# Patient Record
Sex: FEMALE | Race: WHITE | NOT HISPANIC OR LATINO | Employment: OTHER | URBAN - METROPOLITAN AREA
[De-identification: names, ages, dates, MRNs, and addresses within clinical notes are randomized per-mention and may not be internally consistent; named-entity substitution may affect disease eponyms.]

---

## 2018-11-26 ENCOUNTER — HOSPITAL ENCOUNTER (OUTPATIENT)
Dept: RADIOLOGY | Facility: HOSPITAL | Age: 83
Discharge: HOME | End: 2018-11-26
Attending: INTERNAL MEDICINE | Admitting: RADIOLOGY
Payer: COMMERCIAL

## 2018-11-26 VITALS
RESPIRATION RATE: 18 BRPM | OXYGEN SATURATION: 97 % | DIASTOLIC BLOOD PRESSURE: 55 MMHG | SYSTOLIC BLOOD PRESSURE: 124 MMHG | HEART RATE: 79 BPM

## 2018-11-26 DIAGNOSIS — R18.8 ASCITES: ICD-10-CM

## 2018-11-26 LAB
ALBUMIN FLD-MCNC: 1.2 G/DL
APPEARANCE FLD: NORMAL
COLOR FLD: NORMAL
LDH FLD L TO P-CCNC: 74 U/L
LYMPHOCYTES NFR BLD AUTO: 64 %
MONO+MESO NFR FLD MANUAL: 8 %
MONONUC CELLS NFR FLD MANUAL: 15 %
NEUTS SEG NFR BLD AUTO: 13 %
PROT FLD-MCNC: 2.3 G/DL
SITE: NORMAL
TOTAL CELLS COUNTED SPEC: 100
WBC # FLD MANUAL: 443 /UL

## 2018-11-26 PROCEDURE — 49083 ABD PARACENTESIS W/IMAGING: CPT | Performed by: RADIOLOGY

## 2018-11-26 PROCEDURE — 83615 LACTATE (LD) (LDH) ENZYME: CPT | Performed by: INTERNAL MEDICINE

## 2018-11-26 PROCEDURE — 87205 SMEAR GRAM STAIN: CPT | Performed by: INTERNAL MEDICINE

## 2018-11-26 PROCEDURE — 89051 BODY FLUID CELL COUNT: CPT | Performed by: INTERNAL MEDICINE

## 2018-11-26 PROCEDURE — 49083 ABD PARACENTESIS W/IMAGING: CPT

## 2018-11-26 PROCEDURE — 87070 CULTURE OTHR SPECIMN AEROBIC: CPT | Performed by: INTERNAL MEDICINE

## 2018-11-26 PROCEDURE — 82042 OTHER SOURCE ALBUMIN QUAN EA: CPT | Performed by: INTERNAL MEDICINE

## 2018-11-26 PROCEDURE — 84157 ASSAY OF PROTEIN OTHER: CPT | Performed by: INTERNAL MEDICINE

## 2018-11-26 NOTE — DISCHARGE INSTRUCTIONS
Abdominal Paracentesis     WHAT YOU NEED TO KNOW:   Abdominal paracentesis is a procedure to remove abnormal fluid buildup in your abdomen  Fluid builds up because of liver problems, such as swelling and scarring  Heart failure, kidney disease, a mass, or problems with your pancreas may also cause fluid buildup  DISCHARGE INSTRUCTIONS:     Follow up with your healthcare provider as directed: Write down your questions so you remember to ask them during your visits  Wound care: Remove dressing after 24 hours  Leave glue in place  Return to your normal activities    Contact Interventional Radiology at [phone] Man PATIENTS: Contact Interventional Radiology at [phone]) Jose Quinonez PATIENTS: Contact Interventional Radiology at [phone]) if:  · You have a fever and your wound is red and swollen  · You have yellow, green, or bad-smelling discharge coming from your wound  · You have pain or swelling in your abdomen  · You have an upset stomach or you vomit  · You have sudden, sharp pain in your abdomen  · You urinate very little or not at all  · You feel confused and more tired than usual    · Your arm or leg feels warm, tender, and painful  It may look swollen and red  · You suddenly feel lightheaded and have trouble breathing

## 2018-11-26 NOTE — BRIEF OP NOTE (RAD/CATH)
IR PARACENTESIS  Procedure Note    PATIENT NAME: Olivia Lino  : 1934  MRN: [id number]     Pre-op Diagnosis:   1  Ascites      Post-op Diagnosis:   1  Ascites        Surgeon:   Cate Jones MD    Estimated Blood Loss: None    Findings: Successful paracentesis with 2 3 L cloudy nasreen colored ascites fluid removed  Specimens: Ascites fluid sent for labs      Complications:  None    Anesthesia: Local    Cate Jones MD     Date: 2018  Time: 1:09 PM

## 2018-11-26 NOTE — H&P
IR H&P    HPI: 80year old female with history of cirrhosis with ascites presents from [facility] ([facility] for paracentesis  PMH:  Cirrhosis  DM  HTN  HLD    PSH:  Cholecystectomy  Right shoulder repair    Vitals:    11/26/18 1238   BP: 116/61   Pulse: 77   Resp: 18   SpO2: 97%     Gen: NAD  Abd: Mildly distended    A/P: 80year old female with history of cirrhosis with ascites presents from [facility] ([facility] for paracentesis      - Paracentesis

## 2018-11-29 LAB
BACTERIA SPEC BFLD CULT: NO GROWTH
GRAM STN SPEC: NORMAL
GRAM STN SPEC: NORMAL